# Patient Record
Sex: FEMALE | Race: WHITE
[De-identification: names, ages, dates, MRNs, and addresses within clinical notes are randomized per-mention and may not be internally consistent; named-entity substitution may affect disease eponyms.]

---

## 2018-11-23 ENCOUNTER — HOSPITAL ENCOUNTER (EMERGENCY)
Dept: HOSPITAL 25 - UCEAST | Age: 63
Discharge: HOME | End: 2018-11-23
Payer: COMMERCIAL

## 2018-11-23 VITALS — SYSTOLIC BLOOD PRESSURE: 142 MMHG | DIASTOLIC BLOOD PRESSURE: 98 MMHG

## 2018-11-23 DIAGNOSIS — Z88.0: ICD-10-CM

## 2018-11-23 DIAGNOSIS — F17.210: ICD-10-CM

## 2018-11-23 DIAGNOSIS — J01.00: Primary | ICD-10-CM

## 2018-11-23 PROCEDURE — G0463 HOSPITAL OUTPT CLINIC VISIT: HCPCS

## 2018-11-23 PROCEDURE — 99212 OFFICE O/P EST SF 10 MIN: CPT

## 2018-11-23 NOTE — UC
Throat Pain/Nasal Alfredo HPI





- HPI Summary


HPI Summary: 


63-year-old female presents with onset of left maxillary sinus pressure for the 

past 2 days as well as onset of mild facial swelling and tenderness to the left 

maxilla this morning.  States she has had some cold-like symptoms for the past 

week including some mild nasal congestion and clear nasal drainage.  Denies 

fever, chills, dental pain, dysphagia, ear pain, cough, chest pain, shortness 

of breath, abdominal pain, nausea, or vomiting.  She is a half a pack a day 

smoker.





- History of Current Complaint


Chief Complaint: UCRespiratory


Stated Complaint: SINUS COMPLAINT


Time Seen by Provider: 18 10:58


Hx Obtained From: Patient


Onset/Duration: Gradual Onset, Lasting Days


Severity: Moderate


Pain Intensity: 4


Cough: None


Associated Signs & Symptoms: Positive: Sinus Discomfort, Nasal Discharge.  

Negative: Dysphagia, Wheezing, Hoarseness, Fever, Vomiting, Rash





- Allergies/Home Medications


Allergies/Adverse Reactions: 


 Allergies











Allergy/AdvReac Type Severity Reaction Status Date / Time


 


amoxicillin Allergy  Hives Verified 18 10:42


 


Penicillins Allergy  Hives Verified 18 10:42











Home Medications: 


 Home Medications





Ibuprofen/Diphenhydramine Cit [Advil Pm Caplet] 1 each PO 18 [History]











PMH/Surg Hx/FS Hx/Imm Hx


Previously Healthy: Yes - Denies significant PMH





- Surgical History


Surgical History: Yes


Surgery Procedure, Year, and Place: tonsilectomy.  





- Family History


Known Family History: Positive: Non-Contributory





- Social History


Occupation: Works From/At Home


Lives: With Family


Alcohol Use: Weekly


Substance Use Type: None


Smoking Status (MU): Light Every Day Tobacco Smoker


Type: Cigarettes


Amount Used/How Often: 1/3 pack daily





Review of Systems


All Other Systems Reviewed And Are Negative: Yes


Constitutional: Negative: Fever, Chills


Skin: Negative: Rash


Eyes: Negative: Drainage, Eye Redness


ENT: Positive: Nasal Discharge, Sinus Congestion, Sinus Pain/Tenderness.  

Negative: Dental Pain, Sore Throat, Ear Ache


Respiratory: Negative: Shortness Of Breath, Cough


Cardiovascular: Negative: Palpitations, Chest Pain


Gastrointestinal: Negative: Abdominal Pain, Vomiting, Nausea


Is Patient Immunocompromised?: No





Physical Exam


Triage Information Reviewed: Yes


Appearance: Well-Appearing, No Pain Distress, Well-Nourished


Vital Signs: 


 Initial Vital Signs











Temp  98.7 F   18 10:39


 


Pulse  90   18 10:39


 


Resp  18   18 10:39


 


BP  142/98   18 10:39


 


Pulse Ox  100   18 10:39











Vital Signs Reviewed: Yes


Eyes: Positive: Conjunctiva Clear.  Negative: Discharge


ENT: Positive: Nasal congestion, TMs normal, Sinus tenderness - Left maxillary 

with mild facial swelling, Uvula midline.  Negative: Pharyngeal erythema - 

Tonsils surgically absent, Nasal drainage, Trismus, Dental tenderness


Dental: Positive: Other: - Multiple dental caries and mild gingival erythema 

with receding gumline. No obvious abscess noted.


Neck: Positive: Supple, Nontender, No Lymphadenopathy


Respiratory: Positive: Lungs clear, Normal breath sounds, No respiratory 

distress


Cardiovascular: Positive: RRR, No Murmur, Pulses Normal, Brisk Capillary Refill


Abdomen Description: Positive: Nontender, No Organomegaly, Soft.  Negative: 

Distended, Guarding


Bowel Sounds: Positive: Present


Neurological: Positive: Alert


Skin: Negative: Rashes





Throat Pain/Nasal Course/Dx





- Course


Course Of Treatment: 63-year-old female presents with onset of left maxillary 

sinus pressure for the past 2 days as well as onset of mild facial swelling and 

tenderness to the left maxilla this morning.  States she has had some cold-like 

symptoms for the past week including some mild nasal congestion and clear nasal 

drainage. Afebrile. Exam reveals maxillary tenderness and mild swelling. She 

does have multiple dental caries and periodontal disease throughout but no 

eveidence of abscess. Will treat for an acute sinus infection with 10 day 

course of doxycycline as well as symptomatic treatment. She is to follow up 

with PCP in 7 days for reevaluation as well as schedule a dental appointment 

for evaluation. Warning symptoms reveiwed. Verbalizes understanding and agrees 

with POC.





- Differential Dx/Diagnosis


Differential Diagnosis/HQI/PQRI: Sinusitis, URI, Other - Dental infection


Provider Diagnoses: acute left maxillary sinusitis





Discharge





- Sign-Out/Discharge


Documenting (check all that apply): Patient Departure


All imaging exams completed and their final reports reviewed: No Studies





- Discharge Plan


Condition: Stable


Disposition: HOME


Prescriptions: 


Doxycycline Hyclate 100 mg PO BID #20 tablet


Fluticasone NASAL SPRAY 50MCG* [Flonase NASAL SPRAY 50MCG*] 2 spray BOTH NARES 

DAILY #1 btl


Patient Education Materials:  Sinusitis (ED)


Referrals: 


No Primary Care Phys,NOPCP [Primary Care Provider] - 


[facility] PHYSICIAN REFERRAL [Outside]


Additional Instructions: 


Your history and exam are consistent with an acute sinus infection. With the 

tenderness and swelling over the left maxillary sinus we will treat you with a 

course of antibiotic.





Start doxycycline 100 mg 1 tab twice a day for 10 days. Do not take with milk 

products as this can effect the absorption of the antibiotic. Be sure to 

complete the entire course even if you are feeling better.





Use a saline rinse kit such as Neti Pot or NeilMed at least twice a day.





Start fluticasone nasal spray 2 sprays each nostril once a day.





Take acetaminophen (Tylenol) or ibuprofen (Advil, Motrin) according to 

directions as needed for fever or pain.





Follow-up with your primary care provider in 7 days for recheck of symptoms. 

Your blood pressure was also elevated in the clinic today so you should have it 

rechecked at that time. I have given you the number for the [facility] Physician Referral line in case you need assistance scheduling with a 

provider.





Since I cannot completely rule out that the facial swelling may be related to a 

dental infection I would recommend that you make an appointment with the 

dentist at the next available appointment for further evaluation.





Seek immediate medical attention if you have a persistent fever greater than 

100.5 F despite taking acetaminophen or ibuprofen, you have increased facial 

swelling, are unable to swallow, have difficulty breathing, or have any 

worsening of symptoms.





- Billing Disposition and Condition


Condition: STABLE


Disposition: Home